# Patient Record
Sex: MALE | Race: ASIAN | NOT HISPANIC OR LATINO | Employment: UNEMPLOYED | URBAN - METROPOLITAN AREA
[De-identification: names, ages, dates, MRNs, and addresses within clinical notes are randomized per-mention and may not be internally consistent; named-entity substitution may affect disease eponyms.]

---
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
## 2024-08-21 ENCOUNTER — OFFICE VISIT (OUTPATIENT)
Dept: FAMILY MEDICINE | Facility: CLINIC | Age: 19
End: 2024-08-21
Payer: COMMERCIAL

## 2024-08-21 VITALS
HEIGHT: 65 IN | TEMPERATURE: 98.3 F | OXYGEN SATURATION: 98 % | SYSTOLIC BLOOD PRESSURE: 107 MMHG | WEIGHT: 150 LBS | BODY MASS INDEX: 24.99 KG/M2 | RESPIRATION RATE: 24 BRPM | DIASTOLIC BLOOD PRESSURE: 72 MMHG | HEART RATE: 83 BPM

## 2024-08-21 DIAGNOSIS — L30.8 OTHER ECZEMA: ICD-10-CM

## 2024-08-21 DIAGNOSIS — L85.9 HYPERKERATOSIS: ICD-10-CM

## 2024-08-21 DIAGNOSIS — Z91.89 NEED FOR DENTAL CARE: Primary | ICD-10-CM

## 2024-08-21 PROCEDURE — 99213 OFFICE O/P EST LOW 20 MIN: CPT | Mod: GC

## 2024-08-21 RX ORDER — TRIAMCINOLONE ACETONIDE 1 MG/G
OINTMENT TOPICAL 2 TIMES DAILY
Qty: 30 G | Refills: 0 | Status: SHIPPED | OUTPATIENT
Start: 2024-08-21

## 2024-08-21 NOTE — PROGRESS NOTES
Assessment & Plan     Need for dental care  Patient requires special circumstances (possibly sedation?) for routine dental cleaning d/t hx of autism. He was instructed to be referred to the [facility].   - Dental Referral; Future    Hyperkeratosis  Other eczema  Hypertrophic lesion on L wrist and 2 spots on BL palms discussed in physical exam. Lesion on wrist appears like eczema - was placed on kenalog w/o much resolve. BL palm lesions appear to be calluses.   - triamcinolone (KENALOG) 0.1 % external ointment; Apply topically 2 times daily.  - Counseled on wrapping wrists after placement of kenalog to help increase potency of medication as well as avoid picking/itching. May need more potent steroid cream if no resolve - may/may need referral to derm for this.   - Considered removal of calluses, but patient would likely not tolerate. Discussed hand soaks w/epsom salts and regular use of moisturizer and/or vaseline/aquaphor to aid with softening and hopefully gradual reduction of calluses.   -Follow-up as needed       Subjective   Deric is a 19 year old, presenting for the following health issues:  Referral (Request referral dental) and Mass (Lump on right hand)      8/21/2024    10:21 AM   Additional Questions   Roomed by Paw         8/21/2024    Information    services provided? No        HPI     Needs special dental clinic since Deric has autism.     Itching/biting of R wrist. Kenalog not super helpful. Wondering about other options. Unsure if it's gotten better.     Patient doesn't do much handiwork, other than touching iPad. Has spots on MCPs of first digit that are visually distressing and seem to be bothering patient.         Review of Systems  Constitutional, HEENT, cardiovascular, pulmonary, gi and gu systems are negative, except as otherwise noted.      Objective    /72   Pulse 83   Temp 98.3  F (36.8  C) (Tympanic)   Resp 24   Ht 1.65 m (5'  "4.96\")   Wt 68 kg (150 lb)   SpO2 98%   BMI 24.99 kg/m    Body mass index is 24.99 kg/m .  Physical Exam   GENERAL: Active, alert, in no acute distress.  LUNGS: Normal WOB, no respiratory distress  HEART: Appears well-perfused.  MSK: ROM of all 4 extremities grossly intact, no BLE edema on gross examination   SKIN: ~10mm raised, yellow callous on lateral palms. 30-40mm raised lesion on lateral wrist - nonerythematous, nonedematous.   NEUROLOGIC: Normal tone throughout. Normal reflexes for age  PSYCH: Mood appropriate, normal affect              Signed Electronically by: Carrington Cottrell MD    "

## 2024-08-21 NOTE — PATIENT INSTRUCTIONS
Please try using the kenalog cream again for the eczema on his wrist. Trying to wrap his wrist to increase the potency of the medicine can help.     Keeping the calluses on his palms well-moisturized with Euracin cream, aquaphor, and vaseline daily can help soften them and reduce the appearance of them. Trying hand soaks with epsom salts can sometimes help as well.